# Patient Record
Sex: FEMALE | Race: WHITE | NOT HISPANIC OR LATINO | Employment: OTHER | URBAN - METROPOLITAN AREA
[De-identification: names, ages, dates, MRNs, and addresses within clinical notes are randomized per-mention and may not be internally consistent; named-entity substitution may affect disease eponyms.]

---

## 2017-02-22 LAB — BMD RECOMMENDATION EXT: NORMAL

## 2019-05-23 LAB — BCS RECOMMENDATION EXT: NORMAL

## 2022-05-19 LAB
LEFT EYE DM RETINOPATHY: POSITIVE
RIGHT EYE DM RETINOPATHY: POSITIVE

## 2023-02-06 LAB
CHOLEST SERPL-MSCNC: 210 MG/DL (ref 25–200)
CHOLEST/HDLC SERPL: 3 {RATIO}
HDLC SERPL-MCNC: 81 MG/DL (ref 23–92)
LDL/HDL RATIO: 1.5
LDLC SERPL CALC-MCNC: 119 MG/DL (ref 0–100)
TRIGL SERPL-MCNC: 52 MG/DL (ref 10–150)
VLDL CHOLESTEROL: 10.4 MG/DL (ref 8–18)

## 2023-07-28 RX ORDER — OFLOXACIN 3 MG/ML
1 SOLUTION/ DROPS OPHTHALMIC 4 TIMES DAILY
COMMUNITY
End: 2023-11-21 | Stop reason: ALTCHOICE

## 2023-07-28 RX ORDER — HYDROXYZINE HYDROCHLORIDE 50 MG/1
100 TABLET, FILM COATED ORAL NIGHTLY
COMMUNITY

## 2023-07-28 RX ORDER — PNV NO.95/FERROUS FUM/FOLIC AC 28MG-0.8MG
100 TABLET ORAL DAILY
COMMUNITY

## 2023-07-28 RX ORDER — TURMERIC ROOT EXTRACT 500 MG
TABLET ORAL DAILY
COMMUNITY

## 2023-07-28 RX ORDER — CLONIDINE HYDROCHLORIDE 0.2 MG/1
0.2 TABLET ORAL 2 TIMES DAILY
COMMUNITY

## 2023-07-28 RX ORDER — ACETAMINOPHEN 500 MG
5000 TABLET ORAL DAILY
COMMUNITY

## 2023-07-28 RX ORDER — TRAZODONE HYDROCHLORIDE 50 MG/1
50 TABLET ORAL NIGHTLY
COMMUNITY

## 2023-07-28 RX ORDER — FLUOXETINE HYDROCHLORIDE 40 MG/1
40 CAPSULE ORAL DAILY
COMMUNITY

## 2023-08-02 PROCEDURE — 85025 COMPLETE CBC W/AUTO DIFF WBC: CPT | Performed by: FAMILY MEDICINE

## 2023-08-02 PROCEDURE — 82550 ASSAY OF CK (CPK): CPT | Performed by: FAMILY MEDICINE

## 2023-08-02 PROCEDURE — 80048 BASIC METABOLIC PNL TOTAL CA: CPT | Performed by: FAMILY MEDICINE

## 2023-08-02 PROCEDURE — 84550 ASSAY OF BLOOD/URIC ACID: CPT | Performed by: FAMILY MEDICINE

## 2023-08-02 PROCEDURE — 80076 HEPATIC FUNCTION PANEL: CPT | Performed by: FAMILY MEDICINE

## 2023-08-02 PROCEDURE — 84443 ASSAY THYROID STIM HORMONE: CPT | Performed by: FAMILY MEDICINE

## 2023-08-02 PROCEDURE — 80061 LIPID PANEL: CPT | Performed by: FAMILY MEDICINE

## 2023-08-02 PROCEDURE — 82306 VITAMIN D 25 HYDROXY: CPT | Performed by: FAMILY MEDICINE

## 2023-08-13 PROBLEM — G47.00 INSOMNIA: Status: ACTIVE | Noted: 2023-08-13

## 2023-08-13 PROBLEM — M85.859 OSTEOPENIA OF HIP: Status: ACTIVE | Noted: 2023-08-13

## 2023-08-13 PROBLEM — F32.A DEPRESSION: Status: ACTIVE | Noted: 2023-08-13

## 2023-08-13 PROBLEM — D64.9 ANEMIA, UNSPECIFIED: Status: ACTIVE | Noted: 2023-08-13

## 2023-08-13 PROBLEM — E55.9 VITAMIN D DEFICIENCY: Status: ACTIVE | Noted: 2023-08-13

## 2023-08-13 PROBLEM — N18.30 CKD (CHRONIC KIDNEY DISEASE), STAGE III: Status: ACTIVE | Noted: 2023-08-13

## 2023-08-13 PROBLEM — E78.5 HYPERLIPIDEMIA: Status: ACTIVE | Noted: 2023-08-13

## 2023-08-13 PROBLEM — I10 HYPERTENSION: Status: ACTIVE | Noted: 2023-08-13

## 2023-08-17 ENCOUNTER — OFFICE VISIT (OUTPATIENT)
Dept: FAMILY MEDICINE | Facility: CLINIC | Age: 73
End: 2023-08-17
Payer: MEDICARE

## 2023-08-17 VITALS
DIASTOLIC BLOOD PRESSURE: 86 MMHG | BODY MASS INDEX: 23.99 KG/M2 | HEIGHT: 62 IN | TEMPERATURE: 97 F | WEIGHT: 130.38 LBS | OXYGEN SATURATION: 98 % | SYSTOLIC BLOOD PRESSURE: 136 MMHG | HEART RATE: 68 BPM

## 2023-08-17 DIAGNOSIS — Z79.899 ENCOUNTER FOR LONG-TERM (CURRENT) USE OF OTHER MEDICATIONS: ICD-10-CM

## 2023-08-17 DIAGNOSIS — I10 PRIMARY HYPERTENSION: ICD-10-CM

## 2023-08-17 DIAGNOSIS — R21 FACIAL RASH: ICD-10-CM

## 2023-08-17 DIAGNOSIS — D64.9 ANEMIA, UNSPECIFIED TYPE: ICD-10-CM

## 2023-08-17 DIAGNOSIS — L21.9 SEBORRHEA: ICD-10-CM

## 2023-08-17 DIAGNOSIS — N18.30 STAGE 3 CHRONIC KIDNEY DISEASE, UNSPECIFIED WHETHER STAGE 3A OR 3B CKD: Primary | ICD-10-CM

## 2023-08-17 PROCEDURE — 99214 OFFICE O/P EST MOD 30 MIN: CPT | Mod: ,,, | Performed by: FAMILY MEDICINE

## 2023-08-17 PROCEDURE — 99214 PR OFFICE/OUTPT VISIT, EST, LEVL IV, 30-39 MIN: ICD-10-PCS | Mod: ,,, | Performed by: FAMILY MEDICINE

## 2023-08-17 RX ORDER — HYDROCORTISONE 25 MG/G
CREAM TOPICAL 3 TIMES DAILY
Qty: 20 G | Refills: 0 | Status: SHIPPED | OUTPATIENT
Start: 2023-08-17 | End: 2023-08-24

## 2023-08-17 NOTE — PROGRESS NOTES
Patient Name: America Breaux     Patient ID: [id number]     Chief Complaint: Results (Here for lab results .Patient complained of rash on face.)      HPI:     America Breaux is a 72 y.o. female here today for follow up and lab work results. Reviewed and discussed lab work results. Patient reports having a rash on her face.  She has been using bleach on her face and says it is helping.    Past Medical History:   Diagnosis Date    Anemia, unspecified     Bereavement     CKD (chronic kidney disease), stage III     Depression     DJD (degenerative joint disease) of knee     Hyperlipidemia     Hypertension     Hypocalcemia     Insomnia     Osteopenia of hip     Vitamin D deficiency         Past Surgical History:   Procedure Laterality Date    GASTRIC BYPASS      HERNIA REPAIR      x2    PARATHYROIDECTOMY      TOTAL ABDOMINAL HYSTERECTOMY          Social History     Socioeconomic History    Marital status: Single    Number of children: 1   Tobacco Use    Smoking status: Former     Current packs/day: 0.00     Types: Cigarettes     Start date:      Quit date:      Years since quittin.6    Smokeless tobacco: Never   Substance and Sexual Activity    Alcohol use: Not Currently     Comment: Quit    Drug use: Never    Sexual activity: Not Currently        Current Outpatient Medications   Medication Instructions    cholecalciferol (vitamin D3) (VITAMIN D3) 5,000 Units, Oral, Daily    cloNIDine (CATAPRES) 0.2 mg, Oral, 2 times daily    cyanocobalamin (VITAMIN B-12) 100 mcg, Oral, Daily    ferrous gluconate (FERGON ORAL) Oral, Daily    FLUoxetine 40 mg, Oral, Daily    hydrocortisone 2.5 % cream Topical (Top), 3 times daily    hydrOXYzine (ATARAX) 100 mg, Oral, Nightly    multivitamin capsule 1 capsule, Oral, Daily    ofloxacin (OCUFLOX) 0.3 % ophthalmic solution 1 drop, 4 times daily    traZODone (DESYREL) 50 mg, Oral, Nightly    turmeric root extract 500 mg Tab Oral    UNABLE TO FIND 1 each, Daily, medication  "name: Immuneti       Review of patient's allergies indicates:   Allergen Reactions    Codeine     Nsaids (non-steroidal anti-inflammatory drug) Other (See Comments)     Secondary to CKD          There is no immunization history on file for this patient.    Patient Care Team:  Saeed Velarde Sr., MD as PCP - General (Family Medicine)     Subjective:     Review of Systems    10 point review of systems conducted, negative except as stated in the history of present illness. See HPI for details.    Objective:     Visit Vitals  /86 (BP Location: Left arm, Patient Position: Sitting, BP Method: Medium (Manual))   Pulse 68   Temp 97.2 °F (36.2 °C)   Ht 5' 2" (1.575 m)   Wt 59.1 kg (130 lb 6.4 oz)   SpO2 98%   BMI 23.85 kg/m²       Physical Exam  Constitutional:       Appearance: Normal appearance.   HENT:      Head: Normocephalic and atraumatic.   Cardiovascular:      Rate and Rhythm: Normal rate and regular rhythm.   Pulmonary:      Effort: Pulmonary effort is normal.      Breath sounds: Normal breath sounds.   Abdominal:      Palpations: Abdomen is soft.      Tenderness: There is no abdominal tenderness.   Musculoskeletal:         General: No swelling or tenderness. Normal range of motion.      Cervical back: Normal range of motion and neck supple.      Right lower leg: No edema.      Left lower leg: No edema.   Skin:     Comments: Patient has a dry slightly reddened facial rash localized to the bilateral nasolabial fold areas.   Neurological:      General: No focal deficit present.      Mental Status: She is alert and oriented to person, place, and time.   Psychiatric:         Mood and Affect: Mood normal.           Assessment:       ICD-10-CM ICD-9-CM   1. Stage 3 chronic kidney disease, unspecified whether stage 3a or 3b CKD  N18.30 585.3   2. Anemia, unspecified type  D64.9 285.9   3. Primary hypertension  I10 401.9   4. Facial rash  R21 782.1   5. Seborrhea  L21.9 706.3   6. Encounter for long-term " (current) use of other medications  Z79.899 V58.69        Plan:     1. Stage 3 chronic kidney disease, unspecified whether stage 3a or 3b CKD  Overview:  Patient has had chronic kidney disease with elevated creatinine for years.  Her baseline creatinine fluctuates from 1.4 to 1.9 depending on her hydration status.  Follow renoprotective measures including Renal Diet (reduce intake of nuts, peanut butter, milk, cheese, dried beans, peas) and Low Sodium Diet (less than 2 grams per day).  Avoid NSAIDs (Aleve, Mobic, Celebrex, Ibuprofen, Advil, Toradol and Diclofenac). May take Tylenol as needed for headache/pain.  Control DM with goal A1C <7. BP goal <130/80. LDL goal < 100.  Stay well hydrated. Avoid alcohol and soda. Limit tea and coffee.    Assessment & Plan:  Most recent Creatinine 1.84 mg/dL on 08/02/2023.  Her previous creatinine on February of 2023 was 1.6.  Instructed patient to increase daily water intake to 4 16 oz bottles.  It has been a few years since we have imaged her kidneys so we will order a ultrasound .    Orders:  -     US Doppler Abd/Retroperitoneal Limited; Future; Expected date: 08/17/2023  -     Basic Metabolic Panel; Future; Expected date: 11/17/2023    2. Anemia, unspecified type  Overview:  Patient has chronic anemia and is multifactorial in origin.  H/H stable at this time .  As of August 2, 2023 her H&H is 10.9 and 35.1 respectively.  MCV 92.9.  Patient's previous H&H in August of 2023 was 11.1 and 36.2 respectively.    Assessment & Plan:  Will continue to monitor her H&H.    Orders:  -     Iron and TIBC; Future; Expected date: 11/17/2023  -     Ferritin; Future; Expected date: 11/17/2023  -     Reticulocytes; Future; Expected date: 11/17/2023  -     Path Review, Peripheral Smear; Future; Expected date: 11/17/2023  -     CBC Auto Differential; Future; Expected date: 11/17/2023  -     Folate; Future; Expected date: 11/17/2023  -     Vitamin B12; Future; Expected date: 11/17/2023    3.  Primary hypertension  Overview:  Continue with Clonidine 0.2 mg BID  Patient is well controlled.  Follow up with Cardiologist, patient has appt on 08/25/2023.  Low Sodium Diet (DASH Diet - Less than 2 grams of sodium per day).  Monitor blood pressure daily and log. Report consistent numbers greater than 140/90.  Maintain healthy weight with goal BMI <30. Exercise 30 minutes per day, 5 days per week.      4. Facial rash  Overview:  It Is difficult to say what the patient's facial rash was due to because she has been applying bleach to her skin.  It quite possibly could have been a seborrhea type rash.    Assessment & Plan:  Will give the patient a prescription for low-dose hydrocortisone cream for facial rash.      5. Seborrhea  -     hydrocortisone 2.5 % cream; Apply topically 3 (three) times daily. for 7 days  Dispense: 20 g; Refill: 0    6. Encounter for long-term (current) use of other medications  Overview:  Patient was instructed to continue with the prescribed medications as no adverse or cost issues were found.   Refills were given if needed.  Compliance issues were discussed as far as medications that patient is currently taking.  Discussed the possibility of getting off some medications that were not absolutely necessary.    Orders:  -     Vitamin D; Future; Expected date: 11/17/2023        [x] Discussed lab findings with the patient.  [x] Discussed diet, exercise and if appropriate, weight loss.  [x] Instructions and information, including risks and benefits of prescribed medication(s) have been reviewed with the patient and patient verbalizes understanding. Questions have been answered to the patient's satisfaction.  [] Appropriate counseling has been given regarding anxiety and depressive issues that were discussed today.  [] Any lab drawn today will be reviewed by physician at the time it is received and appropriate recommendations bill be made and discussed with patient.     Follow up in about 3 months  (around 11/17/2023) for Follow Up, With Fasting Labs prior to visit.   In addition to their scheduled follow up, the patient has also been instructed to follow up on as needed basis.     Future Appointments   Date Time Provider Department Center   11/16/2023  8:10 AM NURSE, [facility] JOHNNY [place]   11/21/2023  9:00 AM Saeed Velarde Sr., MD [facility] with [facility] NOA Velarde Sr, MD

## 2023-08-17 NOTE — ASSESSMENT & PLAN NOTE
Most recent Creatinine 1.84 mg/dL on 08/02/2023.  Her previous creatinine on February of 2023 was 1.6.  Instructed patient to increase daily water intake to 4 16 oz bottles.  It has been a few years since we have imaged her kidneys so we will order a ultrasound .

## 2023-11-16 PROCEDURE — 80048 BASIC METABOLIC PNL TOTAL CA: CPT | Performed by: FAMILY MEDICINE

## 2023-11-16 PROCEDURE — 85045 AUTOMATED RETICULOCYTE COUNT: CPT | Performed by: FAMILY MEDICINE

## 2023-11-16 PROCEDURE — 85025 COMPLETE CBC W/AUTO DIFF WBC: CPT | Performed by: FAMILY MEDICINE

## 2023-11-16 PROCEDURE — 82746 ASSAY OF FOLIC ACID SERUM: CPT | Performed by: FAMILY MEDICINE

## 2023-11-16 PROCEDURE — 83540 ASSAY OF IRON: CPT | Performed by: FAMILY MEDICINE

## 2023-11-16 PROCEDURE — 86225 DNA ANTIBODY NATIVE: CPT | Performed by: FAMILY MEDICINE

## 2023-11-16 PROCEDURE — 82607 VITAMIN B-12: CPT | Performed by: FAMILY MEDICINE

## 2023-11-16 PROCEDURE — 86039 ANTINUCLEAR ANTIBODIES (ANA): CPT | Performed by: FAMILY MEDICINE

## 2023-11-16 PROCEDURE — 82306 VITAMIN D 25 HYDROXY: CPT | Performed by: FAMILY MEDICINE

## 2023-11-16 PROCEDURE — 82728 ASSAY OF FERRITIN: CPT | Performed by: FAMILY MEDICINE

## 2023-11-21 ENCOUNTER — OFFICE VISIT (OUTPATIENT)
Dept: FAMILY MEDICINE | Facility: CLINIC | Age: 73
End: 2023-11-21
Payer: MEDICARE

## 2023-11-21 VITALS
TEMPERATURE: 97 F | OXYGEN SATURATION: 95 % | WEIGHT: 129 LBS | BODY MASS INDEX: 23.74 KG/M2 | DIASTOLIC BLOOD PRESSURE: 94 MMHG | SYSTOLIC BLOOD PRESSURE: 142 MMHG | HEART RATE: 84 BPM | HEIGHT: 62 IN

## 2023-11-21 DIAGNOSIS — E55.9 VITAMIN D DEFICIENCY: ICD-10-CM

## 2023-11-21 DIAGNOSIS — I10 PRIMARY HYPERTENSION: Primary | ICD-10-CM

## 2023-11-21 DIAGNOSIS — N18.30 STAGE 3 CHRONIC KIDNEY DISEASE, UNSPECIFIED WHETHER STAGE 3A OR 3B CKD: ICD-10-CM

## 2023-11-21 DIAGNOSIS — D64.9 ANEMIA, UNSPECIFIED TYPE: ICD-10-CM

## 2023-11-21 PROCEDURE — 99214 OFFICE O/P EST MOD 30 MIN: CPT | Mod: ,,, | Performed by: FAMILY MEDICINE

## 2023-11-21 PROCEDURE — 99214 PR OFFICE/OUTPT VISIT, EST, LEVL IV, 30-39 MIN: ICD-10-PCS | Mod: ,,, | Performed by: FAMILY MEDICINE

## 2023-11-21 RX ORDER — ACETAMINOPHEN, DEXTROMETHORPHAN HBR, GUAIFENESIN, PHENYLEPHRINE HCL 325; 10; 200; 5 MG/1; MG/1; MG/1; MG/1
TABLET, FILM COATED ORAL DAILY
COMMUNITY

## 2023-11-21 RX ORDER — BENAZEPRIL HYDROCHLORIDE 20 MG/1
20 TABLET ORAL NIGHTLY
Qty: 90 TABLET | Refills: 3 | Status: SHIPPED | OUTPATIENT
Start: 2023-11-21

## 2023-11-21 NOTE — ASSESSMENT & PLAN NOTE
Most recent Hgb 11.2 g/dL, Hct 36.2 %, Folate 7.8 ng/mL, and Iron 46 ug/dL on 11/16/2023.  Increase Fergon to 1 tab daily.  Will start Folic Acid 400 mcg daily.

## 2023-11-21 NOTE — ASSESSMENT & PLAN NOTE
Most recent BUN 25.6 mg/dL and Creatinine 1.39 mg/dL on 11/16/2023.  Continue to increase daily water intake.

## 2023-11-21 NOTE — PROGRESS NOTES
Patient Name: America Breaux     Patient ID: [id number]     Chief Complaint: Results (Here for lab results.)      HPI:     America Breaux is a 73 y.o. female here today for follow up for CKD, Anemia, Vitamin D deficiency, Hypertension, and lab work results. Reviewed and discussed lab work results.    Past Medical History:   Diagnosis Date    Anemia, unspecified     Bereavement     CKD (chronic kidney disease), stage III     Depression     DJD (degenerative joint disease) of knee     Hyperlipidemia     Hypertension     Hypocalcemia     Insomnia     Osteopenia of hip     Vitamin D deficiency         Past Surgical History:   Procedure Laterality Date    GASTRIC BYPASS      HERNIA REPAIR      x2    PARATHYROIDECTOMY      TOTAL ABDOMINAL HYSTERECTOMY          Social History     Socioeconomic History    Marital status: Single    Number of children: 1   Tobacco Use    Smoking status: Former     Types: Cigarettes     Start date:      Quit date:      Years since quittin.9    Smokeless tobacco: Never   Substance and Sexual Activity    Alcohol use: Not Currently     Comment: Quit    Drug use: Never    Sexual activity: Not Currently     Social Determinants of Health     Financial Resource Strain: Low Risk  (2023)    Overall Financial Resource Strain (CARDIA)     Difficulty of Paying Living Expenses: Not hard at all   Food Insecurity: No Food Insecurity (2023)    Hunger Vital Sign     Worried About Running Out of Food in the Last Year: Never true     Ran Out of Food in the Last Year: Never true   Transportation Needs: No Transportation Needs (2023)    PRAPARE - Transportation     Lack of Transportation (Medical): No     Lack of Transportation (Non-Medical): No   Physical Activity: Insufficiently Active (2023)    Exercise Vital Sign     Days of Exercise per Week: 7 days     Minutes of Exercise per Session: 10 min   Stress: No Stress Concern Present (2023)    [facility]  Occupational Health - Occupational Stress Questionnaire     Feeling of Stress : Not at all   Social Connections: Moderately Integrated (11/21/2023)    Social Connection and Isolation Panel [NHANES]     Frequency of Communication with Friends and Family: More than three times a week     Frequency of Social Gatherings with Friends and Family: Once a week     Attends [employer] Services: More than 4 times per year     Active Member of Clubs or Organizations: Yes     Attends Club or Organization Meetings: Never     Marital Status: Never    Housing Stability: Unknown (11/21/2023)    Housing Stability Vital Sign     Unable to Pay for Housing in the Last Year: No     Unstable Housing in the Last Year: No        Current Outpatient Medications   Medication Instructions    benazepriL (LOTENSIN) 20 mg, Oral, Nightly    cholecalciferol (vitamin D3) (VITAMIN D3) 5,000 Units, Oral, Daily    cloNIDine (CATAPRES) 0.2 mg, Oral, 2 times daily    cyanocobalamin (VITAMIN B-12) 100 mcg, Oral, Daily    ferrous gluconate (FERGON ORAL) Oral, Daily    FLUoxetine 40 mg, Oral, Daily    hydrocortisone 2.5 % cream Topical (Top), 3 times daily    hydrOXYzine (ATARAX) 100 mg, Oral, Nightly    multivitamin capsule 1 capsule, Oral, Daily    mv-min-C-glutamin-lysine-hb124 (IMMUNE SUPPORT) 1,000-50 mg TbEF Daily    traZODone (DESYREL) 50 mg, Oral, Nightly    turmeric root extract 500 mg Tab Oral, Daily    UNABLE TO FIND 1 each, Daily, medication name: Immuneti    UNABLE TO FIND 400 mcg, Oral, Daily, medication name: Folic Acid       Review of patient's allergies indicates:   Allergen Reactions    Hydroxychloroquine Anaphylaxis    Codeine     Nsaids (non-steroidal anti-inflammatory drug) Other (See Comments)     Secondary to CKD          There is no immunization history on file for this patient.    Patient Care Team:  Saeed Velarde Sr., MD as PCP - General (Family Medicine)  Brennon Voss MD as Consulting Physician  "(Cardiology)  Suhail Freed MD (Ophthalmology)     Subjective:     Review of Systems    10 point review of systems conducted, negative except as stated in the history of present illness. See HPI for details.    Objective:     Visit Vitals  BP (!) 142/94 (BP Location: Right arm, Patient Position: Sitting, BP Method: Medium (Manual))   Pulse 84   Temp 97.4 °F (36.3 °C)   Ht 5' 2" (1.575 m)   Wt 58.5 kg (129 lb)   SpO2 95%   BMI 23.59 kg/m²       Physical Exam  Constitutional:       Appearance: Normal appearance.   HENT:      Head: Normocephalic and atraumatic.   Cardiovascular:      Rate and Rhythm: Normal rate and regular rhythm.   Pulmonary:      Effort: Pulmonary effort is normal.      Breath sounds: Normal breath sounds.   Abdominal:      Palpations: Abdomen is soft.      Tenderness: There is no abdominal tenderness.   Musculoskeletal:         General: No swelling or tenderness. Normal range of motion.      Cervical back: Normal range of motion and neck supple.      Right lower leg: No edema.      Left lower leg: No edema.   Skin:     General: Skin is warm and dry.   Neurological:      General: No focal deficit present.      Mental Status: She is alert and oriented to person, place, and time.   Psychiatric:         Mood and Affect: Mood normal.         Assessment:       ICD-10-CM ICD-9-CM   1. Primary hypertension  I10 401.9   2. Stage 3 chronic kidney disease, unspecified whether stage 3a or 3b CKD  N18.30 585.3   3. Anemia, unspecified type  D64.9 285.9   4. Vitamin D deficiency  E55.9 268.9       Plan:   1. Primary hypertension  Overview:  Presently on Clonidine 0.2 mg BID as prescribed by Cardiology.  Followed by Cardiologist.  Low Sodium Diet (DASH Diet - Less than 2 grams of sodium per day).  Monitor blood pressure daily and log. Report consistent numbers greater than 140/90.  Maintain healthy weight with goal BMI <30. Exercise 30 minutes per day, 5 days per week.    Assessment & Plan:  BP Readings from " Last 1 Encounters:   11/21/23 (!) 142/94      Patient is not at goal.  Will start Benazepril 20 mg nightly.    Orders:  -     benazepriL (LOTENSIN) 20 MG tablet; Take 1 tablet (20 mg total) by mouth every evening.  Dispense: 90 tablet; Refill: 3    2. Stage 3 chronic kidney disease, unspecified whether stage 3a or 3b CKD  Overview:  Patient has had chronic kidney disease with elevated creatinine for years.  Her baseline creatinine fluctuates from 1.4 to 1.9 depending on her hydration status.  Follow renoprotective measures including Renal Diet (reduce intake of nuts, peanut butter, milk, cheese, dried beans, peas) and Low Sodium Diet (less than 2 grams per day).  Avoid NSAIDs (Aleve, Mobic, Celebrex, Ibuprofen, Advil, Toradol and Diclofenac). May take Tylenol as needed for headache/pain.  Control DM with goal A1C <7. BP goal <130/80. LDL goal < 100.  Stay well hydrated. Avoid alcohol and soda. Limit tea and coffee.    Assessment & Plan:  Most recent BUN 25.6 mg/dL and Creatinine 1.39 mg/dL on 11/16/2023.  Continue to increase daily water intake.    Orders:  -     Basic Metabolic Panel; Future; Expected date: 02/21/2024    3. Anemia, unspecified type  Overview:  Patient has chronic anemia and is multifactorial in origin.  H/H stable at this time.    Assessment & Plan:  Most recent Hgb 11.2 g/dL, Hct 36.2 %, Folate 7.8 ng/mL, and Iron 46 ug/dL on 11/16/2023.  Increase Fergon to 1 tab daily.  Will start Folic Acid 400 mcg daily.    Orders:  -     CBC Auto Differential; Future; Expected date: 02/21/2024  -     Iron; Future; Expected date: 02/21/2024  -     Ferritin; Future; Expected date: 02/21/2024  -     Folate; Future; Expected date: 02/21/2024  -     Vitamin B12; Future; Expected date: 02/21/2024  -     UNABLE TO FIND; Take 400 mcg by mouth once daily. medication name: Folic Acid    4. Vitamin D deficiency  Overview:  Continue with OTC Vitamin D3 5,000 units daily.  Will continue to monitor.    Assessment &  Plan:  Most recent Vitamin D 45.4 ng/mL on 11/16/2023.  Patient is at goal.          [x] Discussed lab findings with the patient.  [x] Discussed diet, exercise and if appropriate, weight loss.  [x] Instructions and information, including risks and benefits of prescribed medication(s) have been reviewed with the patient and patient verbalizes understanding. Questions have been answered to the patient's satisfaction.  [] Appropriate counseling has been given regarding anxiety and depressive issues that were discussed today.  [] Any lab drawn today will be reviewed by physician at the time it is received and appropriate recommendations bill be made and discussed with patient.     Follow up in about 3 months (around 2/21/2024) for Follow Up, With Fasting Labs prior to visit.   In addition to their scheduled follow up, the patient has also been instructed to follow up on as needed basis.     Future Appointments   Date Time Provider Department Center   2/21/2024  7:20 AM LAB, TEA [facility] TEA [place]   2/27/2024  9:30 AM Saeed Velarde Sr., MD TEA Velarde Sr, MD

## 2023-11-21 NOTE — ASSESSMENT & PLAN NOTE
BP Readings from Last 1 Encounters:   11/21/23 (!) 142/94      Patient is not at goal.  Will start Benazepril 20 mg nightly.

## 2024-02-12 DIAGNOSIS — Z00.00 ROUTINE ADULT HEALTH MAINTENANCE: ICD-10-CM

## 2024-02-12 DIAGNOSIS — Z12.31 ENCOUNTER FOR SCREENING MAMMOGRAM FOR BREAST CANCER: ICD-10-CM

## 2024-02-12 DIAGNOSIS — M85.859 OSTEOPENIA OF HIP, UNSPECIFIED LATERALITY: Primary | ICD-10-CM

## 2024-02-12 DIAGNOSIS — M85.88 OTHER SPECIFIED DISORDERS OF BONE DENSITY AND STRUCTURE, OTHER SITE: ICD-10-CM

## 2024-04-18 ENCOUNTER — PATIENT OUTREACH (OUTPATIENT)
Dept: ADMINISTRATIVE | Facility: HOSPITAL | Age: 74
End: 2024-04-18
Payer: MEDICARE

## 2024-04-18 NOTE — LETTER
Dear Ms. America Mcintyrelizeth is committed to your overall health. Periodically we review the health information in your chart to make sure you are up to date on all of your recommended tests and/or procedures.       Our review of your chart shows that you may be due for          Mammogram  Osteoporosis Screening  Uncontrolled BP    Influenza Vaccine  Pneumonia Vaccine  Tetanus Vaccine  Shingles/Zoster Vaccine  RSV Vaccine                If you have had any of the above done at another facility, please let us know and we will request a copy of the report to update your [facility] record.       At your convenience I would like to speak to you to help get these items scheduled (if needed) and also see if there is anything else we can do to help you. Please send me a message via your patient portal or give me a call at [phone].  I am looking forward to speaking with you soon.     Sincerely,      Sloan Jones, [facility] Care Coordinator  Saeed Velarde Sr., MD and your [facility]

## 2024-04-18 NOTE — PROGRESS NOTES
Population Health. Out Reach. Reviewing patient's chart for quality metrics.I attempt pt outreach re: , no answer.  due letter sent to pt via mail.

## 2024-08-15 ENCOUNTER — PATIENT OUTREACH (OUTPATIENT)
Facility: CLINIC | Age: 74
End: 2024-08-15
Payer: MEDICARE

## 2024-08-15 DIAGNOSIS — N18.30 STAGE 3 CHRONIC KIDNEY DISEASE, UNSPECIFIED WHETHER STAGE 3A OR 3B CKD: Primary | ICD-10-CM

## 2024-08-15 NOTE — PROGRESS NOTES
Population Health. Out Reach. Reviewing patient's chart for quality metrics. call [facility] to ck on last mmg/dexa scan, staff reports no recent mmg 2019 and dexa 2017.  Reviewing patient's chart for quality metrics.I attempted to contact patient re: hm, no answer.    Health Maintenance Topic(s) Outreach Outcomes & Actions Taken:    Breast Cancer Screening - Outreach Outcomes & Actions Taken  : due for mmg    Colorectal Cancer Screening - Outreach Outcomes & Actions Taken  : pt due for colon screening    Osteoporosis Screening - Outreach Outcomes & Actions Taken  : pt due for dexa scan    Lab(s) - Outreach Outcomes & Actions Taken  : Overdue Lab(s) Ordered and placed wog for urine cr for kidney screening

## 2024-08-21 PROCEDURE — 83036 HEMOGLOBIN GLYCOSYLATED A1C: CPT | Performed by: FAMILY MEDICINE

## 2024-08-21 PROCEDURE — 80061 LIPID PANEL: CPT | Performed by: FAMILY MEDICINE

## 2024-08-21 PROCEDURE — 84443 ASSAY THYROID STIM HORMONE: CPT | Performed by: FAMILY MEDICINE

## 2024-08-21 PROCEDURE — 85025 COMPLETE CBC W/AUTO DIFF WBC: CPT | Performed by: FAMILY MEDICINE

## 2024-08-21 PROCEDURE — 82728 ASSAY OF FERRITIN: CPT | Performed by: FAMILY MEDICINE

## 2024-08-21 PROCEDURE — 82306 VITAMIN D 25 HYDROXY: CPT | Performed by: FAMILY MEDICINE

## 2024-08-21 PROCEDURE — 82746 ASSAY OF FOLIC ACID SERUM: CPT | Performed by: FAMILY MEDICINE

## 2024-08-21 PROCEDURE — 83540 ASSAY OF IRON: CPT | Performed by: FAMILY MEDICINE

## 2024-08-21 PROCEDURE — 82607 VITAMIN B-12: CPT | Performed by: FAMILY MEDICINE

## 2024-08-21 PROCEDURE — 80053 COMPREHEN METABOLIC PANEL: CPT | Performed by: FAMILY MEDICINE

## 2024-09-05 PROBLEM — E87.5 HYPERKALEMIA: Status: ACTIVE | Noted: 2024-09-05

## 2024-09-05 NOTE — ASSESSMENT & PLAN NOTE
Fluctuating at baseline, mildly elevated potassium  Fourteen years ago she had salmonella and reports that this is whenever her kidneys declined and they have been stable since then  Declines nephrology evaluation today  We will get CMP in 3 months and re-evaluate at that time

## 2024-09-05 NOTE — PROGRESS NOTES
Family Medicine      Patient ID: [id number]     Chief Complaint: Medicare Annual Wellness     HPI:     America Breaux is a 73 y.o. female here today for a Medicare Annual Wellness visit and comprehensive Health Risk Assessment.       Health Maintenance         Date Due Completion Date    Hepatitis C Screening Never done ---    Annual UACr Never done ---    TETANUS VACCINE Never done ---    Shingles Vaccine (1 of 2) Never done ---    RSV Vaccine (Age 60+ and Pregnant patients) (1 - 1-dose 60+ series) Never done ---    Pneumococcal Vaccines (Age 65+) (1 of 1 - PCV) Never done ---    DEXA Scan 2019    Influenza Vaccine (1) 2024    COVID-19 Vaccine (5 - -24 season) 2024    Colorectal Cancer Screening 2024 (Originally 10/14/1995) ---    Mammogram 2024 (Originally 2020) 2019    Lipid Panel 2029             Past Medical History:   Diagnosis Date    Anemia, unspecified     Bereavement     Cataract     CKD (chronic kidney disease), stage III     Depression     DJD (degenerative joint disease) of knee     Hyperlipidemia     Hypertension     Hypocalcemia     Insomnia     Osteopenia of hip     Unspecified macular degeneration     Dr Murillo    Vitamin D deficiency         Past Surgical History:   Procedure Laterality Date    GASTRIC BYPASS      HERNIA REPAIR      x2    PARATHYROIDECTOMY      TOTAL ABDOMINAL HYSTERECTOMY          Social History     Socioeconomic History    Marital status: Single    Number of children: 1   Tobacco Use    Smoking status: Former     Types: Cigarettes     Start date:      Quit date:      Years since quittin.7    Smokeless tobacco: Never   Substance and Sexual Activity    Alcohol use: Not Currently     Comment: Quit    Drug use: Never    Sexual activity: Not Currently     Social Determinants of Health     Financial Resource Strain: Low Risk  (2023)    Overall Financial Resource Strain  (CARDIA)     Difficulty of Paying Living Expenses: Not hard at all   Food Insecurity: No Food Insecurity (11/21/2023)    Hunger Vital Sign     Worried About Running Out of Food in the Last Year: Never true     Ran Out of Food in the Last Year: Never true   Transportation Needs: No Transportation Needs (11/21/2023)    PRAPARE - Transportation     Lack of Transportation (Medical): No     Lack of Transportation (Non-Medical): No   Physical Activity: Insufficiently Active (11/21/2023)    Exercise Vital Sign     Days of Exercise per Week: 7 days     Minutes of Exercise per Session: 10 min   Stress: No Stress Concern Present (11/21/2023)    [employer] - Occupational Stress Questionnaire     Feeling of Stress : Not at all   Housing Stability: Unknown (11/21/2023)    Housing Stability Vital Sign     Unable to Pay for Housing in the Last Year: No     Unstable Housing in the Last Year: No        Family History   Problem Relation Name Age of Onset    Diabetes Mother      Hypertension Mother      Angina Mother      Heart disease Father      Hypertension Father      Aneurysm Father          Current Outpatient Medications   Medication Instructions    benazepriL (LOTENSIN) 20 mg, Oral, Nightly    cholecalciferol (vitamin D3) (VITAMIN D3) 5,000 Units, Oral, Daily    cloNIDine (CATAPRES) 0.2 mg, Oral, 2 times daily    cyanocobalamin (VITAMIN B-12) 100 mcg, Oral, Daily    ferrous gluconate (FERGON ORAL) Oral, Daily    FLUoxetine 40 mg, Oral, Daily    hydrocortisone 2.5 % cream Topical (Top), 3 times daily    hydrOXYzine (ATARAX) 100 mg, Oral, Nightly    multivitamin capsule 1 capsule, Oral, Daily    mv-min-C-glutamin-lysine-hb124 (IMMUNE SUPPORT) 1,000-50 mg TbEF Daily    TOPROL XL 25 mg, Oral, Daily    traZODone (DESYREL) 50 mg, Oral, Nightly    turmeric root extract 500 mg Tab Oral, Daily    UNABLE TO FIND 1 each, Daily, medication name: Immuneti    UNABLE TO FIND 400 mcg, Oral, Daily, medication name:  "Folic Acid       Review of patient's allergies indicates:   Allergen Reactions    Hydroxychloroquine Anaphylaxis    Codeine     Nsaids (non-steroidal anti-inflammatory drug) Other (See Comments)     Secondary to CKD        Immunization History   Administered Date(s) Administered    COVID-19 MRNA, LN-S PF (MODERNA HALF 0.25 ML DOSE) 12/04/2021, 06/06/2022    COVID-19, MRNA, LN-S, PF (MODERNA FULL 0.5 ML DOSE) 03/05/2021, 04/02/2021    DTP [birth year], 06/26/1951    Influenza 09/14/2013    Influenza - Quadrivalent - High Dose - PF (65 years and older) 12/17/2020    Influenza - Trivalent (ADULT) 09/14/2013        Patient Care Team:  Saeed Velarde Sr., MD as PCP - General (Family Medicine)  Suhail Freed MD (Ophthalmology)  Karen Mendoza LPN as Care Coordinator  Brennon Taylor Jr., MD as Resident (Cardiology)    Subjective:     Review of Systems   Constitutional:  Negative for activity change, appetite change, fever and unexpected weight change.   HENT:  Negative for congestion, rhinorrhea and sore throat.    Eyes:  Negative for visual disturbance.   Respiratory:  Negative for cough, chest tightness and shortness of breath.    Cardiovascular:  Negative for chest pain, palpitations and leg swelling.   Gastrointestinal:  Negative for abdominal pain, blood in stool, nausea and vomiting.   Genitourinary:  Negative for difficulty urinating.   Musculoskeletal:  Negative for arthralgias.   Skin:  Negative for rash.   Neurological:  Negative for dizziness, speech difficulty, weakness, light-headedness and numbness.   Psychiatric/Behavioral:  Negative for behavioral problems, confusion, dysphoric mood, self-injury, sleep disturbance and suicidal ideas.        12 point review of systems conducted, negative except as stated in the history of present illness. See HPI for details.    Objective:     Visit Vitals  BP (!) 156/96   Pulse (!) 56   Temp 97.2 °F (36.2 °C)   Resp 20   Ht 5' 2" (1.575 m)   Wt " 59.9 kg (132 lb)   SpO2 96%   BMI 24.14 kg/m²       Physical Exam  Constitutional:       General: She is not in acute distress.     Appearance: Normal appearance. She is not ill-appearing.   HENT:      Head: Normocephalic and atraumatic.      Right Ear: External ear normal.      Left Ear: External ear normal.      Nose: No congestion.   Eyes:      General:         Right eye: No discharge.         Left eye: No discharge.      Extraocular Movements: Extraocular movements intact.      Conjunctiva/sclera: Conjunctivae normal.   Cardiovascular:      Rate and Rhythm: Normal rate and regular rhythm.   Pulmonary:      Effort: Pulmonary effort is normal. No respiratory distress.      Breath sounds: Normal breath sounds.   Musculoskeletal:      Right lower leg: No edema.      Left lower leg: No edema.   Skin:     Capillary Refill: Capillary refill takes less than 2 seconds.   Neurological:      Mental Status: She is alert and oriented to person, place, and time. Mental status is at baseline.   Psychiatric:         Mood and Affect: Mood normal.         Behavior: Behavior normal.         Thought Content: Thought content normal.         Judgment: Judgment normal.           Screenings     The following assessments were completed and reviewed. See completed screening forms and assessments within the Encounter Summary.    [x] Health Risk Assessment   [x] CVD Risk Factors   [x] Obesity/Physical Activity -  Encouraged daily 30 minute physical activity x 5 days per week.   [x] Home Safety/Living Situation   [x] Alcohol Screen  [x] Depression (PHQ) Screen   [x] Timed Get Up and Go   [x] Whisper Test  [x] Cognitive Function/Impairment Screen   [x] Nutrition Screening  [x] ADL Screen   [x] Opioid Screen:  [x] Patient does not have a prescription for opioids.   [] Patient has a prescription for opioids but is at low risk for abuse.   [x] Substance Abuse Screen:   [x] Patient does not use substances.   [] Patient screens positive for  substance use disorder.     Advanced Care Planning     Advance Care Planning   Advance Care Planning     Date: 09/05/2024    Power of   Patient reports she has a healthcare power of  documented but does not believe we have that paperwork here.  In those documents she reports she named her daughter her healthcare power of .  She only has 1 daughter.  She will get that documentation and forwarded to us      Code Status  Patient we would like to be full code, that is, she would like chest compressions and temporary bagging/intubation until such time she is stabilized and diagnosis and prognosis have been made.    A total of 5 min was spent on advance care planning, goals of care discussion, emotional support, formulating and communicating prognosis and exploring burden/benefit of various approaches of treatment. This discussion occurred on a fully voluntary basis with the verbal consent of the patient and/or family.        I attest to discussing Advance Care Planning with patient and/or family member.  Education was provided including the importance of the Health Care Power of , Advance Directives, and/or [place] documentation.  The patient expressed understanding to the importance of this information and discussion.       Assessment:       ICD-10-CM ICD-9-CM   1. Hyperkalemia  E87.5 276.7   2. Stage 3 chronic kidney disease, unspecified whether stage 3a or 3b CKD  N18.30 585.3   3. Anemia, unspecified type  D64.9 285.9   4. Mixed hyperlipidemia  E78.2 272.2   5. Primary hypertension  I10 401.9   6. Osteopenia of hip, unspecified laterality  M85.859 733.90        Plan:     1. Hyperkalemia  Overview:  Fluctuating potassium 5.3  Stopped benazepril 2 months ago  History of CKD    Assessment & Plan:  Repeat CMP three-months.    Orders:  -     Comprehensive Metabolic Panel; Future; Expected date: 12/09/2024  -     Comprehensive Metabolic Panel; Future; Expected date: 03/09/2025    2. Stage 3  chronic kidney disease, unspecified whether stage 3a or 3b CKD  Overview:  Chronic fluctuating creatinine 1.4-1.8  Hypertensive, chronic NSAID use  No diabetes    Assessment & Plan:  Fluctuating at baseline, mildly elevated potassium  Fourteen years ago she had salmonella and reports that this is whenever her kidneys declined and they have been stable since then  Declines nephrology evaluation today  We will get CMP in 3 months and re-evaluate at that time      Orders:  -     Comprehensive Metabolic Panel; Future; Expected date: 03/09/2025  -     Urinalysis; Future; Expected date: 03/09/2025    3. Anemia, unspecified type  Overview:  Patient has chronic anemia and is multifactorial in origin.  History of iron-deficiency  Taking Fergon  Taking folate 400 mcg    Assessment & Plan:  Folate normal  B12 normal  Low iron, low normal ferritin.   Iron and TIBC  Peripheral smear  Haptoglobin  LDH  Reticulocytes    Orders:  -     Haptoglobin; Future; Expected date: 09/09/2024  -     Iron and TIBC; Future; Expected date: 09/09/2024  -     Reticulocytes; Future; Expected date: 09/09/2024  -     Path Review, Peripheral Smear; Future; Expected date: 09/09/2024  -     Lactate Dehydrogenase; Future; Expected date: 09/09/2024  -     CBC Auto Differential; Future; Expected date: 03/09/2025    4. Mixed hyperlipidemia  Overview:  Background:  Medication:   Optimal LDL-C: <100 (Ref)  Optimal non HDL-C: <130 (Ref)  Optimal ApoB: <100 (Ref)  Risk factors:  BMI greater than 30  Current LDL-C:  125  Current non HDL-C: 136  Current ApoB:       Assessment/Plan:  Not at goal  Very high risk category  She would like her cardiologist to hand the lipids  Recommend lifestyle modifications:  ? Diet:   - Adopt a mediterranean diet, with an emphasis on vegetables, fruits (limited tropical fruits), fish, poultry, non-tropical vegetable oils, nuts; minimize red meat.  - Significantly reduce consumption of bread, pasta, rice, potatoes, sweets, and  sugary drinks  - Reduce saturated fat; eliminate fried foods and trans fats; choose grilled over fried food  - limit ultra processed food (items that come in a box or a wrapper) and eat a whole food diet  - Limit your portion sizes   ? Exercise:  - Regular aerobic activity (at least 150 minutes/week of moderate-intensity activity, 75 minutes/week of vigorous intensity activity, or an equivalent combination of both)  - Resistance exercises (weights, bands, body-weight) at least two times per week      AACE/ACE Consensus Statement on Dyslipidemia Management   (https://doi.org/10.4158/KO-8042-2318)        5. Primary hypertension  Overview:  Presently on Clonidine 0.2 mg b.i.d., by Cardiology  Toprol, by Cardiology      Low Sodium Diet (DASH Diet - Less than 2 grams of sodium per day).  Monitor blood pressure daily and log. Report consistent numbers greater than 140/90.  Maintain healthy weight with goal BMI <30. Exercise 30 minutes per day, 5 days per week.    Assessment & Plan:  BP Readings from Last 1 Encounters:   09/09/24 (!) 156/96      Took herself off of benazepril 2 months ago because she thought it was making her blood pressure   go up  Not at goal today.  Hypertension handled by Cardiology who recently changed her to Toprol    Orders:  -     Urinalysis; Future; Expected date: 03/09/2025    6. Osteopenia of hip, unspecified laterality  Overview:  Does not recall ever having DEXA done    Assessment & Plan:  Declines DEXA scan               Provided patient with a 5-10 year written screening schedule and personal prevention plan. Recommendations were developed using the USPSTF age appropriate recommendations. Education, counseling, and referrals were provided as needed. After Visit Summary printed and given to patient, which includes a list of additional screenings\tests needed.    Follow up in about 3 months (around 12/9/2024) for Follow up 3 months just for lab (CMP); follow up 6 months with all labs  thereafter. In addition to their scheduled follow up, the patient has also been instructed to follow up on as needed basis.     Future Appointments   Date Time Provider Department Center   12/9/2024  8:40 AM LAB, [facility] FAMILY MED [facility] NOA Bolivar   12/11/2024  8:00 AM PROVIDER, [facility] FAMILY MEDICINE [facility] NOA Pillai MD

## 2024-09-05 NOTE — ASSESSMENT & PLAN NOTE
Folate normal  B12 normal  Low iron, low normal ferritin.   Iron and TIBC  Peripheral smear  Haptoglobin  LDH  Reticulocytes

## 2024-09-09 ENCOUNTER — OFFICE VISIT (OUTPATIENT)
Dept: FAMILY MEDICINE | Facility: CLINIC | Age: 74
End: 2024-09-09
Payer: MEDICARE

## 2024-09-09 VITALS
HEIGHT: 62 IN | DIASTOLIC BLOOD PRESSURE: 96 MMHG | RESPIRATION RATE: 20 BRPM | SYSTOLIC BLOOD PRESSURE: 156 MMHG | TEMPERATURE: 97 F | OXYGEN SATURATION: 96 % | BODY MASS INDEX: 24.29 KG/M2 | WEIGHT: 132 LBS | HEART RATE: 56 BPM

## 2024-09-09 DIAGNOSIS — D64.9 ANEMIA, UNSPECIFIED TYPE: ICD-10-CM

## 2024-09-09 DIAGNOSIS — E78.2 MIXED HYPERLIPIDEMIA: ICD-10-CM

## 2024-09-09 DIAGNOSIS — I10 PRIMARY HYPERTENSION: ICD-10-CM

## 2024-09-09 DIAGNOSIS — N18.30 STAGE 3 CHRONIC KIDNEY DISEASE, UNSPECIFIED WHETHER STAGE 3A OR 3B CKD: ICD-10-CM

## 2024-09-09 DIAGNOSIS — M85.859 OSTEOPENIA OF HIP, UNSPECIFIED LATERALITY: ICD-10-CM

## 2024-09-09 DIAGNOSIS — E87.5 HYPERKALEMIA: Primary | ICD-10-CM

## 2024-09-09 LAB
CREAT UR-MCNC: 121.3 MG/DL (ref 45–106)
HAPTOGLOB SERPL-MCNC: 100 MG/DL (ref 63–273)
IRON SATN MFR SERPL: 28 % (ref 20–50)
IRON SERPL-MCNC: 81 UG/DL (ref 50–170)
LDH SERPL-CCNC: 376 U/L (ref 125–220)
MICROALBUMIN UR-MCNC: 17.3 UG/ML
MICROALBUMIN/CREAT RATIO PNL UR: 14.3 MG/GM CR (ref 0–30)
RET# (OHS): 0.04 X10E6/UL (ref 0.02–0.08)
RETICULOCYTE COUNT AUTOMATED (OLG): 1.13 % (ref 1.1–2.1)
TIBC SERPL-MCNC: 206 UG/DL (ref 70–310)
TIBC SERPL-MCNC: 287 UG/DL (ref 250–450)
TRANSFERRIN SERPL-MCNC: 265 MG/DL (ref 173–360)

## 2024-09-09 PROCEDURE — 82043 UR ALBUMIN QUANTITATIVE: CPT | Performed by: FAMILY MEDICINE

## 2024-09-09 PROCEDURE — 82570 ASSAY OF URINE CREATININE: CPT | Performed by: FAMILY MEDICINE

## 2024-09-09 PROCEDURE — G0439 PPPS, SUBSEQ VISIT: HCPCS | Mod: ,,, | Performed by: FAMILY MEDICINE

## 2024-09-09 RX ORDER — METOPROLOL SUCCINATE 25 MG/1
25 TABLET, EXTENDED RELEASE ORAL DAILY
COMMUNITY
Start: 2024-07-12

## 2024-09-09 NOTE — LETTER
AUTHORIZATION FOR RELEASE OF   CONFIDENTIAL INFORMATION    Dear Dr Herrera,    We are seeing America Breaux, date of birth 1950, in the clinic at [facility]. Saeed Velarde Sr., MD is the patient's PCP. America Breaux has an outstanding lab/procedure at the time we reviewed her chart. In order to help keep her health information updated, she has authorized us to request the following medical record(s):        (  )  MAMMOGRAM                                      (  )  COLONOSCOPY      (  )  PAP SMEAR                                          (  )  OUTSIDE LAB RESULTS     (  )  DEXA SCAN                                          ( X )  EYE EXAM            (  )  FOOT EXAM                                          (  )  ENTIRE RECORD     (  )  OUTSIDE IMMUNIZATIONS                 (  )  _______________         Please fax records to [facility], Bourgeois, Leonard Jb. Sr., MD, [phone]     If you have any questions, please contact Aarti at [phone]        Patient Name: America Breaux  : 1950  Patient Phone #: [phone]

## 2024-09-09 NOTE — LETTER
AUTHORIZATION FOR RELEASE OF   CONFIDENTIAL INFORMATION    Dear Dr Taylor,    We are seeing America Breaux, date of birth 1950, in the clinic at [facility]. Saeed Velarde Sr., MD is the patient's PCP. America Breaux has an outstanding lab/procedure at the time we reviewed her chart. In order to help keep her health information updated, she has authorized us to request the following medical record(s):        (  )  MAMMOGRAM                                      (  )  COLONOSCOPY      (  )  PAP SMEAR                                          (  )  OUTSIDE LAB RESULTS     (  )  DEXA SCAN                                          (  )  EYE EXAM            (  )  FOOT EXAM                                          (  )  ENTIRE RECORD     (  )  OUTSIDE IMMUNIZATIONS                 ( X )  Last progress note_______________         Please fax records to [facility], Bourgeois, Leonard Jb. Sr., MD, [phone]     If you have any questions, please contact Aarti at [phone]        Patient Name: America Breaux  : 1950  Patient Phone #: [phone]

## 2024-09-10 ENCOUNTER — DOCUMENTATION ONLY (OUTPATIENT)
Dept: FAMILY MEDICINE | Facility: CLINIC | Age: 74
End: 2024-09-10
Payer: MEDICARE

## 2024-09-10 LAB — HEMATOLOGIST REVIEW: NORMAL

## 2024-12-09 PROCEDURE — 80053 COMPREHEN METABOLIC PANEL: CPT | Performed by: FAMILY MEDICINE

## 2024-12-10 ENCOUNTER — TELEPHONE (OUTPATIENT)
Dept: FAMILY MEDICINE | Facility: CLINIC | Age: 74
End: 2024-12-10
Payer: MEDICARE

## 2024-12-10 NOTE — TELEPHONE ENCOUNTER
----- Message from Roshan Pillai MD sent at 12/10/2024  7:25 AM CST -----  Hyperkalemia has resolved.  Patient probably needs an appointment in 3 more months so that it will be 6 months from last time we saw her.

## 2024-12-16 ENCOUNTER — PATIENT OUTREACH (OUTPATIENT)
Facility: CLINIC | Age: 74
End: 2024-12-16
Payer: MEDICARE

## 2024-12-16 NOTE — PROGRESS NOTES
Population Health. Out Reach. Reviewing patient's chart for quality metrics.I attempt pt outreach re: hm due, no answer. Place note pt due for mammogram, dexa and colon screening and flu shot per hm. Mailed hm due letter to pt.

## 2024-12-16 NOTE — LETTER
Dear Ms. Madeline [facility] is committed to your overall health. Periodically we review the health information in your chart to make sure you are up to date on all of your recommended tests and/or procedures.       Our review of your chart shows that you may be due for     Health Maintenance Due   Topic    Colorectal Cancer Screening     Mammogram    Bone Density    If you have had any of the above done at another facility, please let us know and we will request a copy of the report to update your [facility] record.       At your convenience I would like to speak to you to help get these items scheduled (if needed) and also see if there is anything else we can do to help you. Please send me a message via your patient portal or give me a call at [phone].  I am looking forward to speaking with you soon.     Sincerely,      THALIA Jones Care Coordinator  Saeed Velarde Sr., MD and your [facility] Primary [facility] Team

## 2025-03-17 PROCEDURE — 85025 COMPLETE CBC W/AUTO DIFF WBC: CPT | Performed by: FAMILY MEDICINE

## 2025-03-17 PROCEDURE — 83036 HEMOGLOBIN GLYCOSYLATED A1C: CPT | Performed by: FAMILY MEDICINE

## 2025-03-17 PROCEDURE — 80053 COMPREHEN METABOLIC PANEL: CPT | Performed by: FAMILY MEDICINE

## 2025-03-17 PROCEDURE — 80061 LIPID PANEL: CPT | Performed by: FAMILY MEDICINE

## 2025-03-17 PROCEDURE — 84443 ASSAY THYROID STIM HORMONE: CPT | Performed by: FAMILY MEDICINE

## 2025-03-17 PROCEDURE — 82306 VITAMIN D 25 HYDROXY: CPT | Performed by: FAMILY MEDICINE

## 2025-03-18 ENCOUNTER — PATIENT OUTREACH (OUTPATIENT)
Facility: CLINIC | Age: 75
End: 2025-03-18
Payer: MEDICARE

## 2025-03-18 ENCOUNTER — RESULTS FOLLOW-UP (OUTPATIENT)
Dept: PRIMARY CARE CLINIC | Facility: CLINIC | Age: 75
End: 2025-03-18

## 2025-03-18 NOTE — PROGRESS NOTES
Population Health. Out Reach. Reviewing patient's chart for quality metrics.I attempt pt outreach re: hm, vm, lm for pt to return call. Hm due letter mailed to pt.     Health Maintenance Topic(s) Outreach Outcomes & Actions Taken:    Breast Cancer Screening - Outreach Outcomes & Actions Taken  : last noted mmg 5/23/2019 at [facility], due for mmg    Osteoporosis Screening - Outreach Outcomes & Actions Taken  : last noted dexa 2/22/17, due for dexa    Colorectal Cancer Screening - Outreach Outcomes & Actions Taken  : noted never done, due    Primary Care Appt - Outreach Outcomes & Actions Taken  : lv-9/9/24, nv 3/20/25 at 10:20 am          
no

## 2025-03-20 ENCOUNTER — OFFICE VISIT (OUTPATIENT)
Dept: FAMILY MEDICINE | Facility: CLINIC | Age: 75
End: 2025-03-20
Payer: MEDICARE

## 2025-03-20 VITALS
RESPIRATION RATE: 20 BRPM | WEIGHT: 133.63 LBS | HEIGHT: 62 IN | SYSTOLIC BLOOD PRESSURE: 159 MMHG | DIASTOLIC BLOOD PRESSURE: 91 MMHG | BODY MASS INDEX: 24.59 KG/M2 | TEMPERATURE: 97 F | HEART RATE: 57 BPM

## 2025-03-20 DIAGNOSIS — N18.32 STAGE 3B CHRONIC KIDNEY DISEASE: ICD-10-CM

## 2025-03-20 DIAGNOSIS — E55.9 VITAMIN D DEFICIENCY: Primary | ICD-10-CM

## 2025-03-20 DIAGNOSIS — N18.30 STAGE 3 CHRONIC KIDNEY DISEASE, UNSPECIFIED WHETHER STAGE 3A OR 3B CKD: ICD-10-CM

## 2025-03-20 DIAGNOSIS — E78.2 MIXED HYPERLIPIDEMIA: ICD-10-CM

## 2025-03-20 DIAGNOSIS — I10 PRIMARY HYPERTENSION: ICD-10-CM

## 2025-03-20 DIAGNOSIS — Z23 ENCOUNTER FOR IMMUNIZATION: ICD-10-CM

## 2025-03-20 LAB
BACTERIA #/AREA URNS AUTO: NORMAL /HPF
BILIRUB UR QL STRIP.AUTO: NEGATIVE
CLARITY UR: CLEAR
COLOR UR AUTO: COLORLESS
GLUCOSE UR QL STRIP: NORMAL
HGB UR QL STRIP: NEGATIVE
KETONES UR QL STRIP: NEGATIVE
LEUKOCYTE ESTERASE UR QL STRIP: NEGATIVE
NITRITE UR QL STRIP: NEGATIVE
PH UR STRIP: 5.5 [PH]
PROT UR QL STRIP: NEGATIVE
RBC #/AREA URNS AUTO: NORMAL /HPF
SP GR UR STRIP.AUTO: 1.01 (ref 1–1.03)
SQUAMOUS #/AREA URNS LPF: NORMAL /HPF
UROBILINOGEN UR STRIP-ACNC: NORMAL
WBC #/AREA URNS AUTO: NORMAL /HPF

## 2025-03-20 PROCEDURE — 90677 PCV20 VACCINE IM: CPT | Mod: ,,,

## 2025-03-20 PROCEDURE — G0009 ADMIN PNEUMOCOCCAL VACCINE: HCPCS | Mod: ,,,

## 2025-03-20 PROCEDURE — 81001 URINALYSIS AUTO W/SCOPE: CPT | Performed by: FAMILY MEDICINE

## 2025-03-20 PROCEDURE — 99214 OFFICE O/P EST MOD 30 MIN: CPT | Mod: ,,,

## 2025-03-20 RX ORDER — MAGNESIUM OXIDE 420 MG/1
420 TABLET ORAL ONCE
COMMUNITY

## 2025-03-20 RX ORDER — FOLIC ACID 0.4 MG
400 TABLET ORAL DAILY
COMMUNITY

## 2025-03-20 RX ORDER — CLONIDINE HYDROCHLORIDE 0.2 MG/1
0.2 TABLET ORAL 2 TIMES DAILY
Qty: 120 TABLET | Refills: 3 | Status: SHIPPED | OUTPATIENT
Start: 2025-03-20

## 2025-03-20 RX ORDER — MULTIVITAMIN WITH IRON
1 TABLET ORAL DAILY
COMMUNITY

## 2025-03-20 NOTE — ASSESSMENT & PLAN NOTE
LDL improved, but still not quite at goal of less than 100.  Will work harder on her diet.  Patient would like Cardiology to manage her cholesterol.  She is currently not taking anything for her cholesterol.

## 2025-03-20 NOTE — ASSESSMENT & PLAN NOTE
Fourteen years ago she had salmonella and reports that this is whenever her kidneys declined and they have been stable since then.  Patient continues to decline nephrology referral.    Lab Results   Component Value Date    EGFRNORACEVR 39 03/17/2025    EGFRNORACEVR 32 12/09/2024     GFR has been stable.  We will continue to monitor with CMP every 3 months.  Follow renoprotective measures including Renal Diet (reduce intake of nuts, peanut butter, milk, cheese, dried beans, peas) and Low Sodium Diet (less than 2 grams per day).  Avoid NSAIDs (Aleve, Mobic, Celebrex, Ibuprofen, Advil, Toradol and Diclofenac). May take Tylenol as needed for headache/pain.  Control DM with goal A1C <7. BP goal <130/80. LDL goal < 100.  Stay well hydrated. Avoid alcohol and soda. Limit tea and coffee.  Smoking Cessation recommended.

## 2025-03-20 NOTE — PROGRESS NOTES
[facility]  Maribell Subramanian MD    Patient ID: [id number]     Chief Complaint: Follow-up (3 month fu and lab results) and Medication Refill (Clonidine )      HPI:     America Breaux is a 74 y.o. female here today for follow up of chronic conditions.    A1c 5.1  Vitamin-D 68    Creatinine 1.42, GFR 39, stable\    Hypertension  Blood pressure 159/91 in clinic today.  Patient is no longer taking her Lotensin because she thought it was increasing her blood pressure.  Cardiology has prescribed clonidine 0.2 mg b.i.d. and Toprol 25 mg daily.  Patient would prefer cardiology to adjust her medications.    Past Medical History:   Diagnosis Date    Anemia, unspecified     Bereavement     Cataract     CKD (chronic kidney disease), stage III     Depression     DJD (degenerative joint disease) of knee     Hyperlipidemia     Hypertension     Hypocalcemia     Insomnia     Osteopenia of hip     Unspecified macular degeneration     Dr Murillo    Vitamin D deficiency         Past Surgical History:   Procedure Laterality Date    GASTRIC BYPASS      HERNIA REPAIR      x2    PARATHYROIDECTOMY      TOTAL ABDOMINAL HYSTERECTOMY          Social History     Tobacco Use    Smoking status: Former     Types: Cigarettes     Start date:      Quit date:      Years since quittin.2    Smokeless tobacco: Never   Substance and Sexual Activity    Alcohol use: Not Currently     Comment: Quit    Drug use: Never    Sexual activity: Not Currently        Current Outpatient Medications   Medication Instructions    cholecalciferol (vitamin D3) (VITAMIN D3) 5,000 Units, Daily    cloNIDine (CATAPRES) 0.2 mg, Oral, 2 times daily    cyanocobalamin (VITAMIN B-12) 100 mcg, Oral, Daily    ferrous gluconate (FERGON ORAL) Daily    FLUoxetine 40 mg, Daily    folic acid (FOLVITE) 400 mcg, Daily    hydrocortisone 2.5 % cream Topical (Top), 3 times daily    hydrOXYzine (ATARAX) 100 mg, Nightly    magnesium oxide 420 mg, Once     "multivitamin capsule 1 capsule, Daily    mv-min-C-glutamin-lysine-hb124 (IMMUNE SUPPORT) 1,000-50 mg TbEF Daily    omega-3 fatty acids/fish oil (FISH OIL-OMEGA-3 FATTY ACIDS) 300-1,000 mg capsule 1 capsule, Daily    TOPROL XL 25 mg, Daily    traZODone (DESYREL) 50 mg, Nightly    turmeric root extract 500 mg Tab Daily    UNABLE TO FIND 1 each, Daily    UNABLE TO FIND 400 mcg, Oral, Daily, medication name: Folic Acid       Review of patient's allergies indicates:   Allergen Reactions    Hydroxychloroquine Anaphylaxis    Codeine     Nsaids (non-steroidal anti-inflammatory drug) Other (See Comments)     Secondary to CKD        Patient Care Team:  Saeed Velarde Sr., MD as PCP - General (Family Medicine)  Suhail Freed MD (Ophthalmology)  Brennon Taylor Jr., MD as Resident (Cardiology)  Karen Mendoza LPN as Care Coordinator     Subjective:     Review of Systems    12 point review of systems conducted, negative except as stated in the history of present illness. See HPI for details.    Objective:     Visit Vitals  BP (!) 159/91 (BP Location: Right arm, Patient Position: Sitting)   Pulse (!) 57   Temp 97.2 °F (36.2 °C)   Resp 20   Ht 5' 2" (1.575 m)   Wt 60.6 kg (133 lb 9.6 oz)   BMI 24.44 kg/m²       Physical Exam  Vitals and nursing note reviewed.   Constitutional:       General: She is not in acute distress.     Appearance: She is not ill-appearing.   HENT:      Head: Normocephalic and atraumatic.   Eyes:      General: No scleral icterus.     Extraocular Movements: Extraocular movements intact.      Conjunctiva/sclera: Conjunctivae normal.   Cardiovascular:      Rate and Rhythm: Normal rate and regular rhythm.      Heart sounds: No murmur heard.     No friction rub. No gallop.   Pulmonary:      Effort: Pulmonary effort is normal. No respiratory distress.      Breath sounds: Normal breath sounds. No wheezing, rhonchi or rales.   Musculoskeletal:         General: Normal range of motion.      Right lower " leg: No edema.      Left lower leg: No edema.   Skin:     General: Skin is warm and dry.   Neurological:      General: No focal deficit present.      Mental Status: She is alert.   Psychiatric:         Mood and Affect: Mood normal.         Labs Reviewed:     Chemistry:  Lab Results   Component Value Date     03/17/2025    K 4.4 03/17/2025    BUN 27.8 (H) 03/17/2025    CREATININE 1.42 (H) 03/17/2025    EGFRNORACEVR 39 03/17/2025    GLUCOSE 91 03/17/2025    CALCIUM 9.1 03/17/2025    ALKPHOS 113 03/17/2025    LABPROT 6.9 03/17/2025    ALBUMIN 3.7 03/17/2025    BILIDIR 0.2 08/02/2023    IBILI 0.50 08/02/2023    AST 23 03/17/2025    ALT 17 03/17/2025    [id number] 68 03/17/2025    TSH 2.572 03/17/2025        Lab Results   Component Value Date    HGBA1C 5.1 03/17/2025        Hematology:  Lab Results   Component Value Date    WBC 5.04 03/17/2025    HGB 11.7 (L) 03/17/2025    HCT 37.0 03/17/2025     03/17/2025       Lipid Panel:  Lab Results   Component Value Date    CHOL 200 03/17/2025    HDL 71 (H) 03/17/2025    .00 03/17/2025    TRIG 82 03/17/2025    TOTALCHOLEST 3 03/17/2025        Urine:  Lab Results   Component Value Date    CREATRANDUR 121.3 (H) 09/09/2024        Assessment:       ICD-10-CM ICD-9-CM   1. Vitamin D deficiency  E55.9 268.9   2. Mixed hyperlipidemia  E78.2 272.2   3. Primary hypertension  I10 401.9   4. Stage 3b chronic kidney disease  N18.32 585.3   5. Stage 3 chronic kidney disease, unspecified whether stage 3a or 3b CKD  N18.30 585.3   6. Encounter for immunization  Z23 V03.89        Plan:     1. Vitamin D deficiency  Overview:  Continue with OTC Vitamin D3 5,000 units daily.  Will continue to monitor.    Assessment & Plan:  Vitamin-D at goal.  Continue above regimen      2. Mixed hyperlipidemia  Overview:  Background:  Medication:   Optimal LDL-C: <100 (Ref)  Optimal non HDL-C: <130 (Ref)  Optimal ApoB: <100 (Ref)  Risk factors:  BMI greater than 30    Assessment/Plan:  Not at  goal  Very high risk category  She would like her cardiologist to hand the lipids  Recommend lifestyle modifications:  ? Diet:   - Adopt a mediterranean diet, with an emphasis on vegetables, fruits (limited tropical fruits), fish, poultry, non-tropical vegetable oils, nuts; minimize red meat.  - Significantly reduce consumption of bread, pasta, rice, potatoes, sweets, and sugary drinks  - Reduce saturated fat; eliminate fried foods and trans fats; choose grilled over fried food  - limit ultra processed food (items that come in a box or a wrapper) and eat a whole food diet  - Limit your portion sizes   ? Exercise:  - Regular aerobic activity (at least 150 minutes/week of moderate-intensity activity, 75 minutes/week of vigorous intensity activity, or an equivalent combination of both)  - Resistance exercises (weights, bands, body-weight) at least two times per week      AACE/ACE Consensus Statement on Dyslipidemia Management   (https://doi.org/10.4158/UR-9134-0317)      Assessment & Plan:  LDL improved, but still not quite at goal of less than 100.  Will work harder on her diet.  Patient would like Cardiology to manage her cholesterol.  She is currently not taking anything for her cholesterol.    Orders:  -     Lipid Panel; Future; Expected date: 06/20/2025    3. Primary hypertension  Overview:  Presently on Clonidine 0.2 mg b.i.d., by Cardiology  Toprol, by Cardiology      Low Sodium Diet (DASH Diet - Less than 2 grams of sodium per day).  Monitor blood pressure daily and log. Report consistent numbers greater than 140/90.  Maintain healthy weight with goal BMI <30. Exercise 30 minutes per day, 5 days per week.    Assessment & Plan:  BP Readings from Last 1 Encounters:   03/20/25 (!) 159/91      Blood pressure elevated.  Patient wants cardiology to manage her blood pressures.  We considered addition of amlodipine today.  Options are limited due to her chronic kidney disease.  She does not want to be on an Ace as she  believes it was increasing her blood pressure.  Follow up with Cardiology as scheduled.    Orders:  -     Urinalysis    4. Stage 3b chronic kidney disease  Overview:  Chronic fluctuating creatinine 1.4-1.8  Hypertensive, chronic NSAID use  No diabetes    Assessment & Plan:  Fourteen years ago she had salmonella and reports that this is whenever her kidneys declined and they have been stable since then.  Patient continues to decline nephrology referral.    Lab Results   Component Value Date    EGFRNORACEVR 39 03/17/2025    EGFRNORACEVR 32 12/09/2024     GFR has been stable.  We will continue to monitor with CMP every 3 months.  Follow renoprotective measures including Renal Diet (reduce intake of nuts, peanut butter, milk, cheese, dried beans, peas) and Low Sodium Diet (less than 2 grams per day).  Avoid NSAIDs (Aleve, Mobic, Celebrex, Ibuprofen, Advil, Toradol and Diclofenac). May take Tylenol as needed for headache/pain.  Control DM with goal A1C <7. BP goal <130/80. LDL goal < 100.  Stay well hydrated. Avoid alcohol and soda. Limit tea and coffee.  Smoking Cessation recommended.      Orders:  -     CBC Auto Differential; Future; Expected date: 06/20/2025  -     Comprehensive Metabolic Panel; Future; Expected date: 06/20/2025    5. Stage 3 chronic kidney disease, unspecified whether stage 3a or 3b CKD  Overview:  Chronic fluctuating creatinine 1.4-1.8  Hypertensive, chronic NSAID use  No diabetes    Assessment & Plan:  Fourteen years ago she had salmonella and reports that this is whenever her kidneys declined and they have been stable since then.  Patient continues to decline nephrology referral.    Lab Results   Component Value Date    EGFRNORACEVR 39 03/17/2025    EGFRNORACEVR 32 12/09/2024     GFR has been stable.  We will continue to monitor with CMP every 3 months.  Follow renoprotective measures including Renal Diet (reduce intake of nuts, peanut butter, milk, cheese, dried beans, peas) and Low Sodium Diet  (less than 2 grams per day).  Avoid NSAIDs (Aleve, Mobic, Celebrex, Ibuprofen, Advil, Toradol and Diclofenac). May take Tylenol as needed for headache/pain.  Control DM with goal A1C <7. BP goal <130/80. LDL goal < 100.  Stay well hydrated. Avoid alcohol and soda. Limit tea and coffee.  Smoking Cessation recommended.      Orders:  -     Urinalysis    6. Encounter for immunization  -     pneumoc 20-susan conj-dip cr(PF) (PREVNAR-20 (PF)) injection Syrg 0.5 mL    Other orders  -     cloNIDine (CATAPRES) 0.2 MG tablet; Take 1 tablet (0.2 mg total) by mouth 2 (two) times daily.  Dispense: 120 tablet; Refill: 3         Follow up in about 6 months (around 9/20/2025). In addition to their scheduled follow up, the patient has also been instructed to follow up on as needed basis.     Future Appointments   Date Time Provider Department Center   9/8/2025  7:40 AM LAB, [facility] JOHNNY Bolivar   9/10/2025 10:00 AM PROVIDER, [facility] JOHNNY Subramanian MD

## 2025-03-20 NOTE — ASSESSMENT & PLAN NOTE
BP Readings from Last 1 Encounters:   03/20/25 (!) 159/91      Blood pressure elevated.  Patient wants cardiology to manage her blood pressures.  We considered addition of amlodipine today.  Options are limited due to her chronic kidney disease.  She does not want to be on an Ace as she believes it was increasing her blood pressure.  Follow up with Cardiology as scheduled.